# Patient Record
Sex: FEMALE | Race: OTHER | HISPANIC OR LATINO | Employment: UNEMPLOYED | URBAN - NONMETROPOLITAN AREA
[De-identification: names, ages, dates, MRNs, and addresses within clinical notes are randomized per-mention and may not be internally consistent; named-entity substitution may affect disease eponyms.]

---

## 2022-01-07 ENCOUNTER — HOSPITAL ENCOUNTER (EMERGENCY)
Facility: HOSPITAL | Age: 52
Discharge: HOME | End: 2022-01-07
Attending: EMERGENCY MEDICINE
Payer: COMMERCIAL

## 2022-01-07 VITALS
DIASTOLIC BLOOD PRESSURE: 78 MMHG | OXYGEN SATURATION: 99 % | BODY MASS INDEX: 26.63 KG/M2 | SYSTOLIC BLOOD PRESSURE: 128 MMHG | HEIGHT: 64 IN | WEIGHT: 156 LBS | RESPIRATION RATE: 18 BRPM | HEART RATE: 86 BPM | TEMPERATURE: 97.8 F

## 2022-01-07 DIAGNOSIS — B34.9 VIRAL SYNDROME: Primary | ICD-10-CM

## 2022-01-07 DIAGNOSIS — Z20.822 ENCOUNTER FOR LABORATORY TESTING FOR COVID-19 VIRUS: ICD-10-CM

## 2022-01-07 LAB
FLUAV RNA RESP QL NAA+PROBE: NEGATIVE
FLUBV RNA RESP QL NAA+PROBE: NEGATIVE
RSV RNA RESP QL NAA+PROBE: NEGATIVE
SARS-COV-2 RNA RESP QL NAA+PROBE: POSITIVE

## 2022-01-07 PROCEDURE — 0241U HB NFCT DS VIR RESP RNA 4 TRGT: CPT | Performed by: EMERGENCY MEDICINE

## 2022-01-07 PROCEDURE — 99283 EMERGENCY DEPT VISIT LOW MDM: CPT

## 2022-01-07 PROCEDURE — 99282 EMERGENCY DEPT VISIT SF MDM: CPT | Performed by: EMERGENCY MEDICINE

## 2022-01-08 NOTE — ED PROVIDER NOTES
History  Chief Complaint   Patient presents with    Generalized Body Aches     patient states fever, chills body aches wants covid test     Patient is a 50-year-old female  She has been sick for 2 days  She has had fevers and chills  She has had body aches and myalgias  She is worried she has COVID  She is unvaccinated  She has headache  No sore throat  She does have a cough and congestion  No chest pain trouble breathing  Symptoms are moderate in severity without aggravating factors  None       History reviewed  No pertinent past medical history  History reviewed  No pertinent surgical history  History reviewed  No pertinent family history  I have reviewed and agree with the history as documented  E-Cigarette/Vaping     E-Cigarette/Vaping Substances     Social History     Tobacco Use    Smoking status: Current Every Day Smoker     Packs/day: 1 00    Smokeless tobacco: Never Used   Substance Use Topics    Alcohol use: Yes    Drug use: Not Currently       Review of Systems   Constitutional: Positive for chills and fever  HENT: Positive for congestion and sore throat  Eyes: Negative for pain, redness and visual disturbance  Respiratory: Positive for cough  Negative for shortness of breath  Cardiovascular: Negative for chest pain and leg swelling  Gastrointestinal: Negative for abdominal pain, diarrhea and vomiting  Endocrine: Negative for polydipsia and polyuria  Genitourinary: Negative for dysuria, frequency, hematuria, vaginal bleeding and vaginal discharge  Musculoskeletal: Positive for myalgias  Negative for back pain and neck pain  Skin: Negative for rash and wound  Allergic/Immunologic: Negative for immunocompromised state  Neurological: Positive for headaches  Negative for weakness and numbness  Hematological: Does not bruise/bleed easily  Psychiatric/Behavioral: Negative for hallucinations and suicidal ideas     All other systems reviewed and are negative  Physical Exam  Physical Exam  Vitals reviewed  Constitutional:       General: She is not in acute distress  HENT:      Head: Normocephalic and atraumatic  Eyes:      General:         Right eye: No discharge  Left eye: No discharge  Conjunctiva/sclera: Conjunctivae normal    Cardiovascular:      Rate and Rhythm: Normal rate and regular rhythm  Pulses: Normal pulses  Heart sounds: Normal heart sounds  No murmur heard  No friction rub  No gallop  Pulmonary:      Effort: Pulmonary effort is normal  No respiratory distress  Breath sounds: Normal breath sounds  No stridor  No wheezing, rhonchi or rales  Abdominal:      General: Bowel sounds are normal  There is no distension  Palpations: Abdomen is soft  Tenderness: There is no abdominal tenderness  There is no right CVA tenderness, left CVA tenderness, guarding or rebound  Musculoskeletal:         General: No swelling, tenderness, deformity or signs of injury  Normal range of motion  Cervical back: Normal range of motion and neck supple  No rigidity  Right lower leg: No edema  Left lower leg: No edema  Comments: No calf tenderness or unilateral leg swelling  Skin:     General: Skin is warm and dry  Coloration: Skin is not jaundiced  Findings: No rash  Neurological:      General: No focal deficit present  Mental Status: She is alert and oriented to person, place, and time  Sensory: No sensory deficit  Motor: Motor function is intact     Psychiatric:         Mood and Affect: Mood normal          Behavior: Behavior normal          Vital Signs  ED Triage Vitals [01/07/22 1931]   Temperature Pulse Respirations Blood Pressure SpO2   97 8 °F (36 6 °C) 86 18 128/78 99 %      Temp Source Heart Rate Source Patient Position - Orthostatic VS BP Location FiO2 (%)   Tympanic Monitor Sitting Right arm --      Pain Score       --           Vitals:    01/07/22 1931   BP: 128/78   Pulse: 86   Patient Position - Orthostatic VS: Sitting         Visual Acuity      ED Medications  Medications - No data to display    Diagnostic Studies  Results Reviewed     Procedure Component Value Units Date/Time    COVID only - 48 hour TAT [[id number]]     Lab Status: No result Specimen: Nares from Nose     COVID/FLU/RSV [[id number]] Collected: 01/07/22 2138    Lab Status: In process Specimen: Nares from Nose Updated: 01/07/22 2146                 No orders to display              Procedures  Procedures         ED Course                                             MDM  Number of Diagnoses or Management Options  Encounter for laboratory testing for COVID-19 virus  Viral syndrome  Diagnosis management comments: Lungs are clear  Good oxygen saturations  Neck is supple  Patient is not septic  Most likely viral   Appropriate for discharge and outpatient management  COVID test sent  Amount and/or Complexity of Data Reviewed  Clinical lab tests: ordered        Disposition  Final diagnoses:   Viral syndrome   Encounter for laboratory testing for COVID-19 virus     Time reflects when diagnosis was documented in both MDM as applicable and the Disposition within this note     Time User Action Codes Description Comment    1/7/2022 10:24 PM Polo Sanes Add [B34 9] Viral syndrome     1/7/2022 10:24 PM Polo Sanes Add [Z20 822] Encounter for laboratory testing for COVID-19 virus       ED Disposition     ED Disposition Condition Date/Time Comment    Discharge Stable Fri Jan 7, 2022 10:24 PM Saji Bartholomew discharge to home/self care  Follow-up Information     Follow up With Specialties Details Why Contact Info [address]  As needed [address] [id number]  [phone] [facility], [address]   [address] [facility], [place], [place], [address]          Patient's Medications    No medications on file       No discharge procedures on file      PDMP Review     None          ED Provider  Electronically Signed by           Nathalie Guerrero MD  01/07/22 6151

## 2022-02-16 ENCOUNTER — PATIENT OUTREACH (OUTPATIENT)
Dept: CASE MANAGEMENT | Facility: HOSPITAL | Age: 52
End: 2022-02-16

## 2022-02-16 NOTE — PROGRESS NOTES
Outpatient Care Management Note:  Patient was identified via report as having a recent non urgent and non life threatening ED visit at [address] [place] [address]  Chart reviewed  Patient was in the ED on 01/07/22 for evaluation of fever, chills, body aches, requesting COVID test  Patient tested positive for COVID-19  Per chart review it appears patient does not have primary care provider  Telephone outreach attempt made to introduce self and role of care management, follow up on general health, outreach for any possible medical or psychosocial services needed and assess for COVID-19 vaccine confidence  Spoke with patient  Patient confirms she does not have a PCP  However she lives in [facility] and is not interested in establishing care with a provider in [facility] ([facility])  SDOH Questionnaire completed  Patient declines having any social issues  Patient is currently employed and owns own vehicle  COVID 19 Vaccine Confidence  Have you received the COVID vaccines? Patient would rather not answer this question  Do you need information on the COVID 19 vaccine? no    ED followup episode closed at this time

## 2024-06-26 ENCOUNTER — HOSPITAL ENCOUNTER (EMERGENCY)
Facility: HOSPITAL | Age: 54
Discharge: HOME | End: 2024-06-27
Attending: EMERGENCY MEDICINE | Admitting: EMERGENCY MEDICINE
Payer: COMMERCIAL

## 2024-06-26 VITALS
BODY MASS INDEX: 26.43 KG/M2 | TEMPERATURE: 97.2 F | WEIGHT: 154 LBS | OXYGEN SATURATION: 95 % | RESPIRATION RATE: 18 BRPM | DIASTOLIC BLOOD PRESSURE: 93 MMHG | HEART RATE: 125 BPM | SYSTOLIC BLOOD PRESSURE: 178 MMHG

## 2024-06-26 DIAGNOSIS — K04.7 DENTAL ABSCESS: Primary | ICD-10-CM

## 2024-06-27 PROCEDURE — 99284 EMERGENCY DEPT VISIT MOD MDM: CPT | Performed by: EMERGENCY MEDICINE

## 2024-06-27 RX ORDER — LIDOCAINE HYDROCHLORIDE AND EPINEPHRINE 10; 10 MG/ML; UG/ML
20 INJECTION, SOLUTION INFILTRATION; PERINEURAL ONCE
Status: COMPLETED | OUTPATIENT
Start: 2024-06-27 | End: 2024-06-27

## 2024-06-27 RX ORDER — CLINDAMYCIN HYDROCHLORIDE 150 MG/1
450 CAPSULE ORAL ONCE
Status: COMPLETED | OUTPATIENT
Start: 2024-06-27 | End: 2024-06-27

## 2024-06-27 RX ORDER — CLINDAMYCIN HYDROCHLORIDE 300 MG/1
300 CAPSULE ORAL EVERY 6 HOURS
Qty: 28 CAPSULE | Refills: 0 | Status: SHIPPED | OUTPATIENT
Start: 2024-06-27 | End: 2024-07-04

## 2024-06-27 RX ORDER — MORPHINE SULFATE 15 MG/1
7.5-15 TABLET ORAL EVERY 8 HOURS PRN
Qty: 4 TABLET | Refills: 0 | Status: SHIPPED | OUTPATIENT
Start: 2024-06-27

## 2024-06-27 RX ORDER — MORPHINE SULFATE 15 MG/1
15 TABLET ORAL ONCE
Status: COMPLETED | OUTPATIENT
Start: 2024-06-27 | End: 2024-06-27

## 2024-06-27 RX ADMIN — CLINDAMYCIN HYDROCHLORIDE 450 MG: 150 CAPSULE ORAL at 00:14

## 2024-06-27 RX ADMIN — LIDOCAINE HYDROCHLORIDE,EPINEPHRINE BITARTRATE 20 ML: 10; .01 INJECTION, SOLUTION INFILTRATION; PERINEURAL at 00:12

## 2024-06-27 RX ADMIN — MORPHINE SULFATE 15 MG: 15 TABLET ORAL at 00:16

## 2024-06-27 NOTE — ED PROVIDER NOTES
History  Chief Complaint   Patient presents with    Dental Pain     Broussard broken teeth on the bottom right, face is swollen. Stated it started today, has been taking OTC medications with no relief.      53-year-old female describes right lower dental pain for the past few days with swelling today.  No fever or chills.  Notes she has bad teeth and needs complete extractions.  Not currently following with a dentist or oral surgeon      History provided by:  Patient  Dental Pain  Location:  Lower  Quality:  Aching and pressure-like  Severity:  Severe  Onset quality:  Gradual  Timing:  Constant  Progression:  Worsening  Chronicity:  New  Context: abscess    Associated symptoms: facial pain, facial swelling and gum swelling    Associated symptoms: no difficulty swallowing, no drooling, no fever, no neck pain, no neck swelling, no oral bleeding and no trismus        None       Past Medical History:   Diagnosis Date    Disease of thyroid gland        History reviewed. No pertinent surgical history.    History reviewed. No pertinent family history.  I have reviewed and agree with the history as documented.    E-Cigarette/Vaping    E-Cigarette Use Never User      E-Cigarette/Vaping Substances    Nicotine No     THC No     CBD No     Flavoring No     Other No     Unknown No      Social History     Tobacco Use    Smoking status: Every Day     Current packs/day: 1.00     Types: Cigarettes    Smokeless tobacco: Never   Vaping Use    Vaping status: Never Used   Substance Use Topics    Alcohol use: Yes    Drug use: Not Currently       Review of Systems   Constitutional:  Negative for fever.   HENT:  Positive for facial swelling. Negative for drooling.    Musculoskeletal:  Negative for neck pain.   All other systems reviewed and are negative.      Physical Exam  Physical Exam  Vitals and nursing note reviewed.   Constitutional:       General: She is not in acute distress.     Appearance: Normal appearance.      Comments: Pleasant,  uncomfortable-appearing, right lower facial swelling lateral to lower distal teeth, conversational, articulate   HENT:      Head: Normocephalic and atraumatic.      Right Ear: External ear normal.      Left Ear: External ear normal.      Nose: Nose normal.      Mouth/Throat:      Mouth: Mucous membranes are moist.      Comments: No posterior oropharyngeal swelling or erythema, no sublingual swelling or glossal deviation, no submandibular swelling or peritracheal swelling or tenderness.  Right    Lower facial swelling is locally tender adjacent to distal lower teeth which are diffusely decayed, no upper teeth.  Adjacent buccal gum is swollen with pustular type changes, locally tender  Eyes:      Conjunctiva/sclera: Conjunctivae normal.   Pulmonary:      Effort: Pulmonary effort is normal.   Abdominal:      General: Abdomen is flat.   Musculoskeletal:         General: No deformity.      Cervical back: Neck supple.   Skin:     General: Skin is warm and dry.      Comments: Good color   Neurological:      General: No focal deficit present.      Mental Status: She is alert.   Psychiatric:         Mood and Affect: Mood normal.         Vital Signs  ED Triage Vitals [06/26/24 2248]   Temperature Pulse Respirations Blood Pressure SpO2   (!) 97.2 °F (36.2 °C) (!) 125 18 (!) 178/93 95 %      Temp Source Heart Rate Source Patient Position - Orthostatic VS BP Location FiO2 (%)   Temporal Monitor Sitting Right arm --      Pain Score       10 - Worst Possible Pain           Vitals:    06/26/24 2248   BP: (!) 178/93   Pulse: (!) 125   Patient Position - Orthostatic VS: Sitting         Visual Acuity      ED Medications  Medications   morphine (MSIR) IR tablet 15 mg (15 mg Oral Given 6/27/24 0016)   clindamycin (CLEOCIN) capsule 450 mg (450 mg Oral Given 6/27/24 0014)   lidocaine-epinephrine (XYLOCAINE/EPINEPHRINE) 1 %-1:100,000 injection 20 mL (20 mL Infiltration Given 6/27/24 0012)       Diagnostic Studies  Results Reviewed        None                   No orders to display              Procedures  Procedures  Buccal abscess change at lower mandibular adjacent to distal lateral teeth locally infiltrated with lidocaine 1% 1.5 mL, using number 18 syringe aspirated purulent material and #11 blade widened with improvement in swelling.  Bleeding resolves.  Remains stable for close outpatient follow-up      ED Course  ED Course as of 06/27/24 0049   Thu Jun 27, 2024 0049 Patient notes improved comfort, voicing understanding.  Patient follow-up with mass and agrees to return if worse or additional symptoms, family present at supportive                               SBIRT 20yo+      Flowsheet Row Most Recent Value   Initial Alcohol Screen: US AUDIT-C     1. How often do you have a drink containing alcohol? 0 Filed at: 06/26/2024 2250   2. How many drinks containing alcohol do you have on a typical day you are drinking?  0 Filed at: 06/26/2024 2250   3a. Male UNDER 65: How often do you have five or more drinks on one occasion? 0 Filed at: 06/26/2024 2250   3b. FEMALE Any Age, or MALE 65+: How often do you have 4 or more drinks on one occassion? 0 Filed at: 06/26/2024 2250   Audit-C Score 0 Filed at: 06/26/2024 2250                      Medical Decision Making  This patient presents with facial swelling, pain.   Diagnostic considerations include dental abscess, parapharyngeal deep space infection, Lemierre's. See ED Course.       Amount and/or Complexity of Data Reviewed  ECG/medicine tests: ordered and independent interpretation performed. Decision-making details documented in ED Course.    Risk  Prescription drug management.             Disposition  Final diagnoses:   Dental abscess     Time reflects when diagnosis was documented in both MDM as applicable and the Disposition within this note       Time User Action Codes Description Comment    6/27/2024 12:33 AM Arben Dolan Add [K04.7] Dental abscess           ED Disposition       ED  Disposition   Discharge    Condition   Stable    Date/Time   Thu Jun 27, 2024 0033    Comment   Meenu Mcfaddenrro discharge to home/self care.                   Follow-up Information       Follow up With Specialties Details Why Contact Info    [facility]  [address] [facility] [address]  [phone]    Jay Edwards DMD Oral Maxillofacial Surgery Schedule an appointment as soon as possible for a visit   [address] [place] [id number] [phone]              Discharge Medication List as of 6/27/2024 12:36 AM        START taking these medications    Details   clindamycin (CLEOCIN) 300 MG capsule Take 1 capsule (300 mg total) by mouth every 6 (six) hours for 7 days, Starting Thu 6/27/2024, Until Thu 7/4/2024, Normal      morphine (MSIR) 15 mg tablet Take 0.5-1 tablets (7.5-15 mg total) by mouth every 8 (eight) hours as needed for severe pain for up to 4 doses Max Daily Amount: 45 mg, Starting Thu 6/27/2024, Normal                 PDMP Review       None            ED Provider  Electronically Signed by             Arben Dolan DO  06/27/24 0049